# Patient Record
Sex: FEMALE | Race: WHITE
[De-identification: names, ages, dates, MRNs, and addresses within clinical notes are randomized per-mention and may not be internally consistent; named-entity substitution may affect disease eponyms.]

---

## 2018-05-31 ENCOUNTER — HOSPITAL ENCOUNTER (EMERGENCY)
Dept: HOSPITAL 17 - NEPA | Age: 33
Discharge: HOME | End: 2018-05-31
Payer: COMMERCIAL

## 2018-05-31 VITALS — WEIGHT: 132.28 LBS | HEIGHT: 66 IN | BODY MASS INDEX: 21.26 KG/M2

## 2018-05-31 VITALS
RESPIRATION RATE: 20 BRPM | DIASTOLIC BLOOD PRESSURE: 92 MMHG | SYSTOLIC BLOOD PRESSURE: 136 MMHG | TEMPERATURE: 98.5 F | HEART RATE: 74 BPM | OXYGEN SATURATION: 100 %
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
## 2018-05-31 DIAGNOSIS — T24.212A: Primary | ICD-10-CM

## 2018-05-31 PROCEDURE — 16020 DRESS/DEBRID P-THICK BURN S: CPT
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
